# Patient Record
Sex: FEMALE | Race: BLACK OR AFRICAN AMERICAN
[De-identification: names, ages, dates, MRNs, and addresses within clinical notes are randomized per-mention and may not be internally consistent; named-entity substitution may affect disease eponyms.]

---

## 2018-04-25 ENCOUNTER — HOSPITAL ENCOUNTER (OUTPATIENT)
Dept: HOSPITAL 74 - JASU-SURG | Age: 53
Discharge: HOME | End: 2018-04-25
Attending: ORTHOPAEDIC SURGERY
Payer: COMMERCIAL

## 2018-04-25 VITALS — BODY MASS INDEX: 34.9 KG/M2

## 2018-04-25 VITALS — SYSTOLIC BLOOD PRESSURE: 138 MMHG | DIASTOLIC BLOOD PRESSURE: 79 MMHG | HEART RATE: 92 BPM

## 2018-04-25 VITALS — TEMPERATURE: 97.9 F

## 2018-04-25 DIAGNOSIS — M23.204: Primary | ICD-10-CM

## 2018-04-25 DIAGNOSIS — M17.12: ICD-10-CM

## 2018-04-25 DIAGNOSIS — M65.9: ICD-10-CM

## 2018-04-25 PROCEDURE — 0SBD4ZZ EXCISION OF LEFT KNEE JOINT, PERCUTANEOUS ENDOSCOPIC APPROACH: ICD-10-PCS | Performed by: ORTHOPAEDIC SURGERY

## 2018-04-25 NOTE — HP
Satellite [facility]





- Chief Complaint


Chief Complaint: lefft knee pain


History Source: Patient





- Past Medical History


Allergies/Adverse Reactions: 


 Allergies











Allergy/AdvReac Type Severity Reaction Status Date / Time


 


No Known Drug Allergies Allergy   Verified 04/25/18 06:46














- Current Medications


Current Medications: 


 Home Medications











 Medication  Instructions  Recorded


 


Ibuprofen [Advil -] 200 mg PO PRN PRN 04/25/18














Satellite Physical Exam





- Physical Examination


Vital Signs: 


 Vital Signs











 Period  Temp  Pulse  Resp  BP Sys/Kirby  Pulse Ox


 


 Last 24 Hr  97.9 F  82  20  139/86  97











Extremities: Other (+ joint line tenderness)





Satellite Impression/Plan





- Impression/Plan


Impression: internal derangement left knee


Operative Procedure: arthrosocpy left knee


Date to be Performed: 04/25/18

## 2018-04-25 NOTE — OP
Operative Note





- Note:


Operative Date: 04/25/18


Pre-Operative Diagnosis: left knee pain, medial meniscus tear, OA


Findings: 





synovitis, OA, meniscus tear


Surgeon: Pal Posey


Assistant: Billy Pyle


Anesthesiologist/CRNA: Kennedy Thomson


Specimens Removed: shavings


Estimated Blood Loss (mls): 25


Drains, Volume Out (mls): 0


Blood Volume Replaced (mls): 0


Fluid Volume Replaced (mls): 0


Operative Report Dictated: Yes

## 2018-04-25 NOTE — OP
DATE OF OPERATION:  

 

DATE OF DICTATION:  04/25/2018

 

PREOPERATIVE DIAGNOSES:  Left knee pain, medial meniscus tear, osteoarthritis.

 

POSTOPERATIVE DIAGNOSES:  Left knee pain, medial meniscus tear, osteoarthritis, plus

synovitis.

 

PROCEDURE:  Left knee arthroscopy, partial medial meniscectomy, debridement

chondroplasty and arthroscopic synovectomy.

 

SURGEON:  Pal Posey MD

 

ASSISTANT:  Billy Pyle MD 

 

ANESTHESIOLOGIST:  Kennedy Bowens MD

 

ANESTHETIST:  Fan Manriquez CRNA

 

ANESTHESIA:  LMA and an intraarticular injection of 20 mL of 0.5% Marcaine.

 

DRAINS:  None.

 

COMPLICATIONS:  None.

 

SPECIMEN:  Arthroscopic shavings.

 

BLOOD LOSS:  25 mL.

 

FLUID REPLACEMENT:  Plasma-Lyte, 500 mL.

 

This patient is a 52-year-old female with the preoperative diagnosis of left knee

pain, medial  meniscus tear, osteoarthritis.  After understanding the potential

risks, complications, alternatives and benefits  of surgical versus nonsurgical

treatment, the patient elected to undergo this procedure.

 

The patient was brought to the operating room, peripheral IV placed and IV sedation

given.  Two grams of IV Ancef was given.  The LMA anesthesia was induced.  The

patient was placed into the C-clamp leg castro with ample padding throughout.  The

left lower extremity was prepped and draped in sterile fashion, elevated,

exsanguinated with an Esmarch bandage, tourniquet inflated to 250 mmHg.  A

superolateral portal was established.  A lateral portal was established under direct

visualization.  Medial portal was established.  A diagnostic arthroscopy was

performed.  The patient's blood pressure was a little bit and there was some bleeding

through the tourniquet.  Therefore, first we tried to put the tourniquet up to 300

mmHg and then we took it down entirely.  Total tourniquet time was 7 minutes.  The

rest of the case was done without the tourniquet.

 

Patient was seen to have a degenerative-type tear of the medial meniscus.  This  was

debrided with a curved shaver.  Patient had some osteoarthritis in areas, grade 3 of

the medial  femoral condyle and the lateral femoral condyle.  These were both

debrided with the curved shaver.  The lateral tibial plateau and medial tibial

plateau had grade 2 changes.  There was a lot of synovitis within the knee.  This was

debrided.  After the partial synovectomy of the intracondylar notch, the ACL was seen

to be intact, directly visualized and probed.  It was good.

 

Next our attention turned to the patellofemoral joint.  The patient had some

chondromalacia of the patella, "crabmeat" effect on the undersurface of the patella. 

This was debrided with the shaver.  The patient had a lot of synovitis in the

patellofemoral joint.  As well this was debrided.  After the partial synovectomy of

the patellofemoral joint we could see the undersurface of the patella which

definitely had widespread osteoarthritis and the femoral trochlea which actually

looked good.  The area was copiously irrigated and washed out.  All instrumentation

and excess saline were removed.  The arthroscopy portals were closed with 3-0 nylon

sutures.  The area was then washed and dried, covered with Xeroform gauze, 4 x 4

gauze,  ABD and Ace bandage.  Total operative time was about 25 minutes.  There were

no complications during the case.  The patient tolerated the procedure quite well and

was brought to the ambulatory recovery room in stable condition.

 

 

LATA [id number]

DD: 04/25/2018 09:33

DT: 04/25/2018 10:40

Job #:  [id number]

## 2018-04-27 NOTE — PATH
Surgical Pathology Report



Patient Name:  GERMANIA LEE

Accession #:  [id number]

[facility]. Rec. #:  [id number]                                                        

   /Age/Gender:  1965 (Age: 52) / F

Account:  [id number]                                                          

             Location: [facility]

Taken:  2018

Received:  2018

Reported:  2018

Physicians:  Pal Posey M.D.

  



Specimen(s) Received

 LEFT KNEE SHAVINGS 





Clinical History

It Left knee tear







Final Diagnosis

KNEE SHAVINGS, LEFT, ARTHROSCOPY:

FRAGMENTS OF DENSE FIBROCONNECTIVE TISSUE, ADIPOSE TISSUE, AND REACTIVE

SYNOVIUM.





***Electronically Signed***

Carola Aldridge M.D.





Gross Description

Received in formalin, labeled "left knee shavings," is a 4.5 x 3.0 x 0.4 cm.

aggregate of tan-yellow soft tissue fragments. A representative portion is

submitted in one cassette.

/2018



saudi/2018

## 2018-06-24 ENCOUNTER — HOSPITAL ENCOUNTER (EMERGENCY)
Dept: HOSPITAL 74 - FER | Age: 53
Discharge: HOME | End: 2018-06-24
Payer: COMMERCIAL

## 2018-06-24 VITALS — DIASTOLIC BLOOD PRESSURE: 102 MMHG | HEART RATE: 86 BPM | SYSTOLIC BLOOD PRESSURE: 157 MMHG

## 2018-06-24 VITALS — TEMPERATURE: 98.2 F

## 2018-06-24 VITALS — BODY MASS INDEX: 34.2 KG/M2

## 2018-06-24 DIAGNOSIS — B02.9: Primary | ICD-10-CM

## 2018-06-24 DIAGNOSIS — I47.1: ICD-10-CM

## 2018-06-24 NOTE — PDOC
History of Present Illness





- General


Chief Complaint: Allergic Reaction


Stated Complaint: ALLERGIC REACTION


Time Seen by Provider: 06/24/18 21:52





- History of Present Illness


Initial Comments: 





This 53-year-old woman with past history of SVT (treated with ablation therapy) 

and recent knee arthroscopy presents with a few day history of right-sided 

upper face pain/headache and one-day history of right forehead rash.  Patient 

states that she developed severe sharp pain in her right ear and right side of 

her upper face and scalp 4 days ago.  She was subsequently seen by ENT physician

; there was no specific abnormality seen and she was treated with a short 

course of prednisone 10 mg daily and naproxen as needed.  Today, she developed 

painful raised red rash with areas of blisters on the right side of her 

forehead with some rash also present in her scalp in the area.  No previous 

history of zoster.  Patient confirms that she had chickenpox as a child.  She 

has not received any zoster vaccine. . Patient denies vision changes.  She has 

mild amount of irritation in the lateral aspect of the right eye


 No recent acute illnesses; patient recently had knee arthroscopy


NO KNOWN DRUG ALLERGIES





No history of smoking; no significant alcohol or recreational drug use














Past History





- Past Medical History


Allergies/Adverse Reactions: 


 Allergies











Allergy/AdvReac Type Severity Reaction Status Date / Time


 


No Known Drug Allergies Allergy   Verified 06/24/18 21:51











Home Medications: 


Ambulatory Orders





Indomethacin [Indocin -] 25 mg PO BID 06/24/18 


Naproxen 500 mg PO DAILY 06/24/18 


Oxycodone HCl/Acetaminophen [Percocet 5-325 mg Tablet] 1 tab PO Q6H PRN #20 

tablet MDD 3 tabs 06/24/18 


Prednisone 10 mg PO DAILY 06/24/18 


Valacyclovir HCl [Valtrex] 1,000 mg PO TID #20 tablet 06/24/18 








Anemia: No


Asthma: No


Cancer: No


Cardiac Disorders: Yes (SVT)


CVA: No


COPD: No


CHF: No


Dementia: No


Diabetes: No


GI Disorders: No


 Disorders: No


HTN: No


Hypercholesterolemia: No


Liver Disease: No


Seizures: No


Thyroid Disease: No





- Surgical History


Appendectomy: Yes


Cardiac Surgery: Yes (ablation)


Cholecystectomy: Yes


Orthopedic Surgery: Yes (arthroscopy knee)





- Suicide/Smoking/Psychosocial Hx


Smoking History: Never smoked


Have you smoked in the past 12 months: No


Information on smoking cessation initiated: No


Hx Alcohol Use: No


Drug/Substance Use Hx: No


Substance Use Type: Alcohol


Hx Substance Use Treatment: No





**Review of Systems





- Review of Systems


Able to Perform ROS?: Yes


Comments:: 





12 point review of systems is negative except for what is noted in the history 

of present illness








*Physical Exam





- Vital Signs


 Last Vital Signs











Temp Pulse Resp BP Pulse Ox


 


 98.2 F   91 H  18   178/111   99 


 


 06/24/18 21:54  06/24/18 21:54  06/24/18 21:54  06/24/18 21:54  06/24/18 21:54














- Physical Exam


Comments: 





GENERAL: Adult female, alert and oriented 3, in no acute distress


HEAD: Faint erythematous rash of the right frontal scalp


EYES: PERRLA, EOMI, sclera anicteric, conjunctiva clear.  Scattered 

erythematous papular rash right forehead extending to right temporal area


                 Scattered vesicular component seen in right temporal region; 

no other rash seen


ENT: Ears normal, nares patent, oropharynx clear without exudates.  Moist 

mucous membranes.


NECK: Normal range of motion, supple without lymphadenopathy, JVD, or masses.


LUNGS: Breath sounds equal, clear to auscultation bilaterally.  No wheezes, and 

no crackles.


HEART:Regular rate and rhythm, normal S1 and S2 without murmur, rub or gallop.


ABDOMEN:.normal bowel sounds  No guarding,tenderness or rebound.No masses No 

distention. 


EXTREMITIES: Normal range of motion, no edema.  No clubbing or cyanosis. No 

erythema, or tenderness.


NEUROLOGICAL: Cranial nerves II through XII grossly intact.  Normal speech.  No 

focal 


neurological deficits. 


MUSCULOSKELETAL:  Back non-tender to palpation, no CVA tenderness


SKIN: Warm, Dry, normal turgor, rash noted above





Medical Decision Making





- Medical Decision Making





This 53-year-old woman with no significant active medical issues presents with 

right-sided headache/upper face pain for several days and one day history of 

painful rash on the right forehead/temporal area.  Exam as noted: Rash 

consistent with zoster(ophthalmic branch,trigeminal)





Patient will be treated with Valtrex 1000 mg 3 times a day for one week.  First 

dose will be given here in the emergency room.  Because patient has had severe 

headache/upper face pain, which has not responded to naproxen, Percocet 5/325 

one tablet will be given here now for pain.


Prescriptions for Valtrex and 4 Percocet 5/325 [(#20) to be used up to 3 times 

a day as needed for headache or severe pain] will be transmitted to her 

pharmacy.  Patient will be given referral information for  Doctor Marjorie (

patient does not have a doctor currently); also, she will be given referral 

information for  for ophthalmologic follow-up (zoster outbreak is in 

ophthalmic branch of trigeminal nerve)








*DC/Admit/Observation/Transfer


Diagnosis at time of Disposition: 


Zoster


Qualifiers:


 Herpes zoster complications: without complications Qualified Code(s): B02.9 - 

Zoster without complications








- Discharge Dispostion


Disposition: HOME


Condition at time of disposition: Stable





- Prescriptions


Prescriptions: 


Oxycodone HCl/Acetaminophen [Percocet 5-325 mg Tablet] 1 tab PO Q6H PRN #20 

tablet MDD 3 tabs


 PRN Reason: Severe Pain


Valacyclovir HCl [Valtrex] 1,000 mg PO TID #20 tablet





- Referrals


Referrals: 


Siomara Amador MD [Staff Physician] - 


Arthur Baca MD [Staff Physician] - 





- Patient Instructions


Printed Discharge Instructions:  Shingles


Additional Instructions: 


Valtrex 1000 mg 3 times a day for 1 week





Naproxen/ibuprofen/acetaminophen for mild to moderate headache





Percocet 5/325 up to 3 times a day as needed for severe pain





Follow-up with general medicine () within the next 5-7 days





Follow-up with ophthalmologist (Dr. Baca) within the next 2-3 days(sooner if 

you have any vision changes)





Return to ER if you have severe pain/fever





- Post Discharge Activity

## 2021-04-12 ENCOUNTER — HOSPITAL ENCOUNTER (INPATIENT)
Dept: HOSPITAL 74 - JASUSAT | Age: 56
LOS: 3 days | Discharge: HOME | DRG: 742 | End: 2021-04-15
Attending: OBSTETRICS & GYNECOLOGY | Admitting: OBSTETRICS & GYNECOLOGY
Payer: COMMERCIAL

## 2021-04-12 VITALS — BODY MASS INDEX: 36.8 KG/M2

## 2021-04-12 DIAGNOSIS — D25.1: Primary | ICD-10-CM

## 2021-04-12 DIAGNOSIS — J98.11: ICD-10-CM

## 2021-04-12 DIAGNOSIS — E66.9: ICD-10-CM

## 2021-04-12 DIAGNOSIS — D25.2: ICD-10-CM

## 2021-04-12 DIAGNOSIS — N83.8: ICD-10-CM

## 2021-04-12 LAB
ALBUMIN SERPL-MCNC: 3.7 G/DL (ref 3.4–5)
ALP SERPL-CCNC: 122 U/L (ref 45–117)
ALT SERPL-CCNC: 40 U/L (ref 13–61)
ANION GAP SERPL CALC-SCNC: 4 MMOL/L (ref 8–16)
ANION GAP SERPL CALC-SCNC: 4 MMOL/L (ref 8–16)
APPEARANCE UR: CLEAR
AST SERPL-CCNC: 22 U/L (ref 15–37)
BACTERIA # UR AUTO: 6747 /UL (ref 0–1359)
BILIRUB SERPL-MCNC: 0.6 MG/DL (ref 0.2–1)
BILIRUB UR STRIP.AUTO-MCNC: NEGATIVE MG/DL
BUN SERPL-MCNC: 14.3 MG/DL (ref 7–18)
BUN SERPL-MCNC: 20.6 MG/DL (ref 7–18)
CALCIUM SERPL-MCNC: 9.3 MG/DL (ref 8.5–10.1)
CALCIUM SERPL-MCNC: 9.4 MG/DL (ref 8.5–10.1)
CASTS URNS QL MICRO: 7 /UL (ref 0–3.1)
CHLORIDE SERPL-SCNC: 103 MMOL/L (ref 98–107)
CHLORIDE SERPL-SCNC: 107 MMOL/L (ref 98–107)
CO2 SERPL-SCNC: 30 MMOL/L (ref 21–32)
CO2 SERPL-SCNC: 31 MMOL/L (ref 21–32)
COLOR UR: YELLOW
CREAT SERPL-MCNC: 0.9 MG/DL (ref 0.55–1.3)
CREAT SERPL-MCNC: 0.9 MG/DL (ref 0.55–1.3)
DEPRECATED RDW RBC AUTO: 14.6 % (ref 11.6–15.6)
DEPRECATED RDW RBC AUTO: 14.7 % (ref 11.6–15.6)
EPITH CASTS URNS QL MICRO: >36 /UL (ref 0–25.1)
GLUCOSE SERPL-MCNC: 141 MG/DL (ref 74–106)
GLUCOSE SERPL-MCNC: 96 MG/DL (ref 74–106)
HCT VFR BLD CALC: 35.3 % (ref 32.4–45.2)
HCT VFR BLD CALC: 37 % (ref 32.4–45.2)
HGB BLD-MCNC: 11.4 GM/DL (ref 10.7–15.3)
HGB BLD-MCNC: 12.2 GM/DL (ref 10.7–15.3)
INR BLD: 1.03 (ref 0.83–1.09)
KETONES UR QL STRIP: NEGATIVE
LEUKOCYTE ESTERASE UR QL STRIP.AUTO: (no result)
MCH RBC QN AUTO: 26.1 PG (ref 25.7–33.7)
MCH RBC QN AUTO: 26.4 PG (ref 25.7–33.7)
MCHC RBC AUTO-ENTMCNC: 32.3 G/DL (ref 32–36)
MCHC RBC AUTO-ENTMCNC: 33 G/DL (ref 32–36)
MCV RBC: 80.2 FL (ref 80–96)
MCV RBC: 80.6 FL (ref 80–96)
NITRITE UR QL STRIP: NEGATIVE
PH UR: 5 [PH] (ref 5–8)
PLATELET # BLD AUTO: 227 K/MM3 (ref 134–434)
PLATELET # BLD AUTO: 246 K/MM3 (ref 134–434)
PMV BLD: 8.9 FL (ref 7.5–11.1)
PMV BLD: 9.2 FL (ref 7.5–11.1)
PROT SERPL-MCNC: 8.1 G/DL (ref 6.4–8.2)
PROT UR QL STRIP: NEGATIVE
PROT UR QL STRIP: NEGATIVE
PT PNL PPP: 12.5 SEC (ref 9.7–13)
RBC # BLD AUTO: 10 /UL (ref 0–23.9)
RBC # BLD AUTO: 4.38 M/MM3 (ref 3.6–5.2)
RBC # BLD AUTO: 4.62 M/MM3 (ref 3.6–5.2)
SODIUM SERPL-SCNC: 138 MMOL/L (ref 136–145)
SODIUM SERPL-SCNC: 141 MMOL/L (ref 136–145)
SP GR UR: 1.02 (ref 1.01–1.03)
UROBILINOGEN UR STRIP-MCNC: 0.2 MG/DL (ref 0.2–1)
WBC # BLD AUTO: 11.7 K/MM3 (ref 4–10)
WBC # BLD AUTO: 7.4 K/MM3 (ref 4–10)
WBC # UR AUTO: 212 /UL (ref 0–25.8)

## 2021-04-12 PROCEDURE — 0UT94ZZ RESECTION OF UTERUS, PERCUTANEOUS ENDOSCOPIC APPROACH: ICD-10-PCS | Performed by: OBSTETRICS & GYNECOLOGY

## 2021-04-12 PROCEDURE — 0UT74ZZ RESECTION OF BILATERAL FALLOPIAN TUBES, PERCUTANEOUS ENDOSCOPIC APPROACH: ICD-10-PCS | Performed by: OBSTETRICS & GYNECOLOGY

## 2021-04-12 PROCEDURE — 8E0W4CZ ROBOTIC ASSISTED PROCEDURE OF TRUNK REGION, PERCUTANEOUS ENDOSCOPIC APPROACH: ICD-10-PCS | Performed by: OBSTETRICS & GYNECOLOGY

## 2021-04-12 RX ADMIN — CEFAZOLIN SODIUM SCH MLS/HR: 1 INJECTION, SOLUTION INTRAVENOUS at 18:00

## 2021-04-13 LAB
ANION GAP SERPL CALC-SCNC: 3 MMOL/L (ref 8–16)
BUN SERPL-MCNC: 11.8 MG/DL (ref 7–18)
CALCIUM SERPL-MCNC: 8.8 MG/DL (ref 8.5–10.1)
CHLORIDE SERPL-SCNC: 102 MMOL/L (ref 98–107)
CO2 SERPL-SCNC: 33 MMOL/L (ref 21–32)
CREAT SERPL-MCNC: 0.9 MG/DL (ref 0.55–1.3)
DEPRECATED RDW RBC AUTO: 14.6 % (ref 11.6–15.6)
GLUCOSE SERPL-MCNC: 107 MG/DL (ref 74–106)
HCT VFR BLD CALC: 31.8 % (ref 32.4–45.2)
HGB BLD-MCNC: 10.6 GM/DL (ref 10.7–15.3)
MCH RBC QN AUTO: 26.8 PG (ref 25.7–33.7)
MCHC RBC AUTO-ENTMCNC: 33.3 G/DL (ref 32–36)
MCV RBC: 80.5 FL (ref 80–96)
PLATELET # BLD AUTO: 202 K/MM3 (ref 134–434)
PMV BLD: 9.1 FL (ref 7.5–11.1)
RBC # BLD AUTO: 3.95 M/MM3 (ref 3.6–5.2)
SODIUM SERPL-SCNC: 138 MMOL/L (ref 136–145)
WBC # BLD AUTO: 10.6 K/MM3 (ref 4–10)

## 2021-04-13 RX ADMIN — ACETAMINOPHEN PRN MG: 325 TABLET ORAL at 09:05

## 2021-04-13 RX ADMIN — ACETAMINOPHEN PRN MG: 325 TABLET ORAL at 14:54

## 2021-04-13 RX ADMIN — CEFAZOLIN SODIUM SCH MLS/HR: 1 INJECTION, SOLUTION INTRAVENOUS at 01:13

## 2021-04-13 RX ADMIN — ENOXAPARIN SODIUM SCH MG: 40 INJECTION SUBCUTANEOUS at 09:05

## 2021-04-13 RX ADMIN — CEFAZOLIN SODIUM SCH MLS/HR: 1 INJECTION, SOLUTION INTRAVENOUS at 09:06

## 2021-04-14 RX ADMIN — POTASSIUM CHLORIDE SCH MEQ: 750 TABLET, EXTENDED RELEASE ORAL at 13:34

## 2021-04-14 RX ADMIN — ACETAMINOPHEN PRN MG: 325 TABLET ORAL at 14:33

## 2021-04-14 RX ADMIN — ENOXAPARIN SODIUM SCH MG: 40 INJECTION SUBCUTANEOUS at 09:23

## 2021-04-14 RX ADMIN — HYDROCHLOROTHIAZIDE SCH MG: 12.5 CAPSULE ORAL at 13:33

## 2021-04-15 VITALS — DIASTOLIC BLOOD PRESSURE: 85 MMHG | SYSTOLIC BLOOD PRESSURE: 152 MMHG | TEMPERATURE: 98.3 F | HEART RATE: 100 BPM

## 2021-04-15 RX ADMIN — HYDROCHLOROTHIAZIDE SCH MG: 12.5 CAPSULE ORAL at 09:25

## 2021-04-15 RX ADMIN — POTASSIUM CHLORIDE SCH MEQ: 750 TABLET, EXTENDED RELEASE ORAL at 09:25

## 2021-04-15 RX ADMIN — ENOXAPARIN SODIUM SCH MG: 40 INJECTION SUBCUTANEOUS at 09:25
